# Patient Record
(demographics unavailable — no encounter records)

---

## 2025-01-08 NOTE — PHYSICAL EXAM
[Alert] : alert [Acute Distress] : no acute distress [Normocephalic] : normocephalic [Flat Open Anterior [place]] : flat open anterior fontanelle [Red Reflex] : red reflex bilateral [Excessive Tearing] : no excessive tearing [PERRL] : PERRL [Normally Placed Ears] : normally placed ears [Auricles Well Formed] : auricles well formed [Clear Tympanic membranes] : clear tympanic membranes [Light reflex present] : light reflex present [Bony landmarks visible] : bony landmarks visible [Discharge] : no discharge [Nares Patent] : nares patent [Palate Intact] : palate intact [Uvula Midline] : uvula midline [Supple, full passive range of motion] : supple, full passive range of motion [Palpable Masses] : no palpable masses [Symmetric Chest Rise] : symmetric chest rise [Clear to Auscultation Bilaterally] : clear to auscultation bilaterally [Regular Rate and Rhythm] : regular rate and rhythm [S1, S2 present] : S1, S2 present [Murmurs] : no murmurs [+2 Femoral Pulses] : (+) 2 femoral pulses [Soft] : soft [Tender] : nontender [Distended] : nondistended [Bowel Sounds] : bowel sounds present [Hepatomegaly] : no hepatomegaly [Splenomegaly] : no splenomegaly [Normal External Genitalia] : normal external genitalia [Circumcised] : circumcised [Central Urethral Opening] : central urethral opening [Testicles Descended] : testicles descended bilaterally [No Abnormal Lymph Nodes Palpated] : no abnormal lymph nodes palpated [Symmetric Abduction and Rotation of hips] : symmetric abduction and rotation of hips [Allis Sign] : negative Allis sign [Straight] : straight [Cranial Nerves Grossly Intact] : cranial nerves grossly intact [Rash or Lesions] : no rash/lesions [de-identified] : + cradle cap + extensive xerosis

## 2025-01-08 NOTE — HISTORY OF PRESENT ILLNESS
[Parents] : parents [Well-balanced] : well-balanced [Fruit] : fruit [Vegetables] : vegetables [Baby food] : baby food [Normal] : Normal [In Crib] : sleeps in crib [On back] : sleeps on back [Co-sleeping] : no co-sleeping [Wakes up at night] : wakes up at night [Sleeps 12-16 hours per 24 hours (including naps)] : sleeps 12-16 hours per 24 hours (including naps) [Loose bedding, pillow, toys, and/or bumpers in crib] : no loose bedding, pillow, toys, and/or bumpers in crib [Sippy Cup use] : sippy cup use [Brushing teeth] : not brushing teeth [Screen time only for video chatting] : screen time only for video chatting [No] : Not at  exposure [Water heater temperature set at <120 degrees F] : Water heater temperature set at <120 degrees F [Rear facing car seat in  back seat] : Rear facing car seat in  back seat [Carbon Monoxide Detectors] : Carbon monoxide detectors [Smoke Detectors] : Smoke detectors [de-identified] : Nutramigen 30 ounces a day  [NO] : No

## 2025-01-08 NOTE — DISCUSSION/SUMMARY
[Normal Growth] : growth [Normal Development] : development [None] : No known medical problems [No Elimination Concerns] : elimination [No Feeding Concerns] : feeding [No Skin Concerns] : skin [Normal Sleep Pattern] : sleep [Family Adaptation] : family adaptation [Infant [place]] : infant independence [Feeding Routine] : feeding routine [Safety] : safety [No Medications] : ~He/She~ is not on any medications [Parent/Guardian] : parent/guardian [Mother] : mother [Father] : father [] : The components of the vaccine(s) to be administered today are listed in the plan of care. The disease(s) for which the vaccine(s) are intended to prevent and the risks have been discussed with the caretaker.  The risks are also included in the appropriate vaccination information statements which have been provided to the patient's caregiver.  The caregiver has given consent to vaccinate. [FreeTextEntry1] : Dorie is a 11 month old male presenting for 9 month [facility]  Missed interval visit 2/2 to insurance concerns.  Feeding well- discussed decreasing formula intake and increasing solid meals + introduction of allergens Discussed gentle skin care and liberal emollient use for xerosis  PE as noted Hepatitis B vaccine administered today  SWYC wnl  RTO in 1 month for 12 month [facility] or as needed  Continue breastmilk or formula as desired. Increase table foods, 3 meals with 2-3 snacks per day. Incorporate up to 6 oz of flourinated water daily in a sippy cup. Discussed weaning of bottle and pacifier. Wipe teeth daily with washcloth. When in car, patient should be in rear-facing car seat in back seat. Put baby to sleep in own crib with no loose or soft bedding. Lower crib matress. Help baby to maintain consistent daily routines and sleep schedule. Recognize stranger anxiety. Ensure home is safe since baby is increasingly mobile. Be within arm's reach of baby at all times. Use consistent, positive discipline. Avoid screen time. Read aloud to baby.

## 2025-02-14 NOTE — PHYSICAL EXAM
[Alert] : alert [Normocephalic] : normocephalic [Closed Anterior [place]] : closed anterior fontanelle [Red Reflex] : red reflex bilateral [PERRL] : PERRL [Normally Placed Ears] : normally placed ears [Auricles Well Formed] : auricles well formed [Clear Tympanic membranes] : clear tympanic membranes [Light reflex present] : light reflex present [Bony landmarks visible] : bony landmarks visible [Nares Patent] : nares patent [Palate Intact] : palate intact [Uvula Midline] : uvula midline [Tooth Eruption] : tooth eruption [Supple, full passive range of motion] : supple, full passive range of motion [Symmetric Chest Rise] : symmetric chest rise [Clear to Auscultation Bilaterally] : clear to auscultation bilaterally [Regular Rate and Rhythm] : regular rate and rhythm [S1, S2 present] : S1, S2 present [+2 Femoral Pulses] : (+) 2 femoral pulses [Soft] : soft [Bowel Sounds] : normoactive bowel sounds [Normal External Genitalia] : normal external genitalia [Central Urethral Opening] : central urethral opening [Testicles Descended] : testicles descended bilaterally [No Abnormal Lymph Nodes Palpated] : no abnormal lymph nodes palpated [Symmetric Abduction and Rotation of Hips] : symmetric abduction and rotation of hips [Straight] : straight [Cranial Nerves Grossly Intact] : cranial nerves grossly intact [Rash or Lesions] : rash and/or lesion present [Discharge] : no discharge [Palpable Masses] : no palpable masses [Murmurs] : no murmurs [Tender] : nontender [Distended] : nondistended [Hepatomegaly] : no hepatomegaly [Splenomegaly] : no splenomegaly [Allis Sign] : negative Allis sign [de-identified] : + diffuse xerosis- no skin breakdown

## 2025-02-14 NOTE — DISCUSSION/SUMMARY
[Normal Growth] : growth [Normal Development] : development [None] : No known medical problems [No Elimination Concerns] : elimination [No Feeding Concerns] : feeding [No Skin Concerns] : skin [Normal Sleep Pattern] : sleep [Family Support] : family support [Establishing Routines] : establishing routines [Feeding and Appetite Changes] : feeding and appetite changes [Establishing A Dental Home] : establishing a dental home [Safety] : safety [No Medications] : ~He/She~ is not on any medications [Parent/Guardian] : parent/guardian [Mother] : mother [Father] : father [] : The components of the vaccine(s) to be administered today are listed in the plan of care. The disease(s) for which the vaccine(s) are intended to prevent and the risks have been discussed with the caretaker.  The risks are also included in the appropriate vaccination information statements which have been provided to the patient's caregiver.  The caregiver has given consent to vaccinate. [FreeTextEntry1] : Dorie is a 12 month old male presenting for WCC  Diffuse xerosis- discussed gentle skin care, gentle products and emollient use with wet wraps Normal growth and development Discussed decreasing milk consumption and increasing water  PE as noted MMR and Varicella vaccines administered Routine CBC and lead labs drawn and will follow up RTO in 3 months for wCC or as needed  Transition to whole cow's milk. Continue table foods, 3 meals with 2-3 snacks per day. Incorporate up to 6 oz of flourinated water daily in a sippy cup. Brush teeth twice a day with soft toothbrush. Recommend visit to dentist. When in car, keep child in rear-facing car seats until age 2, or until  the maximum height and weight for seat is reached. Put baby to sleep in own crib with no loose or soft bedding. Lower crib matress. Help baby to maintain consistent daily routines and sleep schedule. Recognize stranger and separation anxiety. Ensure home is safe since baby is increasingly mobile. Be within arm's reach of baby at all times. Use consistent, positive discipline. Avoid screen time. Read aloud to baby.

## 2025-02-14 NOTE — HISTORY OF PRESENT ILLNESS
[Parents] : parents [Cow's milk ___ oz/feed] : [unfilled] oz of Cow's milk per feed [Table food] : table food [Normal] : Normal [Sippy cup use] : Sippy cup use [Brushing teeth] : Brushing teeth [No] : Not at  exposure [Water heater temperature set at <120 degrees F] : Water heater temperature set at <120 degrees F [Car seat in back seat] : Car seat in back seat [Smoke Detectors] : Smoke detectors [Carbon Monoxide Detectors] : Carbon monoxide detectors [Up to date] : Up to date [NO] : No [Exposure to electronic nicotine delivery system] : No exposure to electronic nicotine delivery system [At risk for exposure to TB] : Not at risk for exposure to Tuberculosis [de-identified] : 30 ounces

## 2025-04-09 NOTE — REVIEW OF SYSTEMS
[Fever] : fever [Negative] : Genitourinary [Cough] : cough [Congestion] : congestion [Seizure] : seizures

## 2025-04-09 NOTE — HISTORY OF PRESENT ILLNESS
[de-identified] : HFU febrile seizure  [FreeTextEntry6] : Dorie is a 14 month old male presenting for HFU for febrile seizure. Fever started one day ago- tmax 103- yesterday at 6pm- noted to have a 10-15 minute episode of stiffening and eyes rolling back. He was taken to [facility] and diagnosed with febrile seizure 2/2 to viral illness and sent home. Continues to have fever, cough and runny nose.

## 2025-04-09 NOTE — DISCUSSION/SUMMARY
[FreeTextEntry1] : Dorie is a 14 month old male presenting for fever. Physical exam with some nasal congestion but otherwise non focal. Discussed supportive care for viral illness. Discussed RTO and seizure precautions.

## 2025-04-09 NOTE — HISTORY OF PRESENT ILLNESS
[de-identified] : HFU febrile seizure  [FreeTextEntry6] : Dorie is a 14 month old male presenting for HFU for febrile seizure. Fever started one day ago- tmax 103- yesterday at 6pm- noted to have a 10-15 minute episode of stiffening and eyes rolling back. He was taken to [facility] and diagnosed with febrile seizure 2/2 to viral illness and sent home. Continues to have fever, cough and runny nose.

## 2025-06-04 NOTE — HISTORY OF PRESENT ILLNESS
[de-identified] : HFU [FreeTextEntry6] : Dorie is a 16 month old male presenting for HFU for seizure. Recent hx of recurrent febrile seizure- had a seizure lasting over than 5 minutes and was taken to [facility]- there had a negative BCx, Ucx, CXR but positive for Adenovirus. Spot EEG was abnormal and started on Keppra BID by neurology with prn Diastat and follow up in 1 month. No seizures for past 2 days- continues on Keppra. Fever and symptoms are improving.

## 2025-06-04 NOTE — PHYSICAL EXAM
[Clear Rhinorrhea] : clear rhinorrhea [NL] : moves all extremities x4, warm, well perfused x4 [de-identified] : extensive xerosis and scaling of torso

## 2025-06-04 NOTE — HISTORY OF PRESENT ILLNESS
[de-identified] : HFU [FreeTextEntry6] : Dorie is a 16 month old male presenting for HFU for seizure. Recent hx of recurrent febrile seizure- had a seizure lasting over than 5 minutes and was taken to [facility]- there had a negative BCx, Ucx, CXR but positive for Adenovirus. Spot EEG was abnormal and started on Keppra BID by neurology with prn Diastat and follow up in 1 month. No seizures for past 2 days- continues on Keppra. Fever and symptoms are improving.

## 2025-06-04 NOTE — PHYSICAL EXAM
[Clear Rhinorrhea] : clear rhinorrhea [NL] : moves all extremities x4, warm, well perfused x4 [de-identified] : extensive xerosis and scaling of torso

## 2025-06-04 NOTE — DISCUSSION/SUMMARY
[FreeTextEntry1] : Dorie is a 16 month old male presenting for HFU for seizure. Physical exam with congestion and baseline extensive xerosis/scaling( mom has dermatology referral- will make appointment). Discussed use of prn Diastat. Discussed continuing Keppra until Neurology follow up with ER visit should breakthrough seizures requiring prn occur. Mother endorsed understanding. RTO as needed.